# Patient Record
Sex: FEMALE | URBAN - METROPOLITAN AREA
[De-identification: names, ages, dates, MRNs, and addresses within clinical notes are randomized per-mention and may not be internally consistent; named-entity substitution may affect disease eponyms.]

---

## 2018-02-26 ENCOUNTER — OFFICE VISIT (OUTPATIENT)
Dept: OTOLARYNGOLOGY | Facility: CLINIC | Age: 48
End: 2018-02-26

## 2018-02-26 VITALS
DIASTOLIC BLOOD PRESSURE: 76 MMHG | SYSTOLIC BLOOD PRESSURE: 118 MMHG | WEIGHT: 155 LBS | TEMPERATURE: 99 F | BODY MASS INDEX: 27.46 KG/M2 | HEIGHT: 63 IN

## 2018-02-26 DIAGNOSIS — R51.9 TEMPORAL HEADACHE: Primary | ICD-10-CM

## 2018-02-26 DIAGNOSIS — R09.81 NASAL CONGESTION: ICD-10-CM

## 2018-02-26 PROCEDURE — 99212 OFFICE O/P EST SF 10 MIN: CPT | Performed by: OTOLARYNGOLOGY

## 2018-02-26 PROCEDURE — 99203 OFFICE O/P NEW LOW 30 MIN: CPT | Performed by: OTOLARYNGOLOGY

## 2018-02-26 RX ORDER — MELOXICAM 15 MG/1
15 TABLET ORAL DAILY
Qty: 30 TABLET | Refills: 3 | Status: SHIPPED | OUTPATIENT
Start: 2018-02-26

## 2018-02-26 RX ORDER — MONTELUKAST SODIUM 10 MG/1
10 TABLET ORAL NIGHTLY
Qty: 30 TABLET | Refills: 3 | Status: SHIPPED | OUTPATIENT
Start: 2018-02-26

## 2018-02-26 RX ORDER — FLUTICASONE PROPIONATE 50 MCG
1 SPRAY, SUSPENSION (ML) NASAL 2 TIMES DAILY
Qty: 1 BOTTLE | Refills: 3 | Status: SHIPPED | OUTPATIENT
Start: 2018-02-26

## 2018-02-27 NOTE — PROGRESS NOTES
Home Griggs is a 41year old female.   Patient presents with:  Sinus Problem: headaches, facial pain, nasal congestion, and popping of left ear for over a year       HISTORY OF PRESENT ILLNESS   She presents with complaints of facial/sinus pressure as well heartbeat/palpitations and syncope. GI Negative Abdominal pain and diarrhea. Endocrine Negative Cold intolerance and heat intolerance. Neuro Negative Tremors. Psych Negative Anxiety and depression.    Integumentary Negative Frequent skin infections, nightly., Disp: 30 tablet, Rfl: 3  •  Loratadine-Pseudoephedrine ER 5-120 MG Oral Tablet 12 Hr, Take 1 tablet by mouth every 12 (twelve) hours. , Disp: 60 tablet, Rfl: 3  •  Fluticasone Propionate 50 MCG/ACT Nasal Suspension, 1 spray by Nasal route 2 (two)